# Patient Record
Sex: FEMALE | Race: WHITE | NOT HISPANIC OR LATINO | Employment: FULL TIME | URBAN - METROPOLITAN AREA
[De-identification: names, ages, dates, MRNs, and addresses within clinical notes are randomized per-mention and may not be internally consistent; named-entity substitution may affect disease eponyms.]

---

## 2017-12-06 ENCOUNTER — GENERIC CONVERSION - ENCOUNTER (OUTPATIENT)
Dept: OTHER | Facility: OTHER | Age: 22
End: 2017-12-06

## 2017-12-06 DIAGNOSIS — R53.83 OTHER FATIGUE: ICD-10-CM

## 2017-12-06 DIAGNOSIS — J45.909 UNCOMPLICATED ASTHMA: ICD-10-CM

## 2017-12-14 ENCOUNTER — LAB (OUTPATIENT)
Dept: LAB | Age: 22
End: 2017-12-14
Payer: COMMERCIAL

## 2017-12-14 ENCOUNTER — TRANSCRIBE ORDERS (OUTPATIENT)
Dept: ADMINISTRATIVE | Age: 22
End: 2017-12-14

## 2017-12-14 DIAGNOSIS — J45.909 UNCOMPLICATED ASTHMA: ICD-10-CM

## 2017-12-14 DIAGNOSIS — R53.83 OTHER FATIGUE: ICD-10-CM

## 2017-12-14 LAB
25(OH)D3 SERPL-MCNC: 26.2 NG/ML (ref 30–100)
ALBUMIN SERPL BCP-MCNC: 3.7 G/DL (ref 3.5–5)
ALP SERPL-CCNC: 48 U/L (ref 46–116)
ALT SERPL W P-5'-P-CCNC: 16 U/L (ref 12–78)
ANION GAP SERPL CALCULATED.3IONS-SCNC: 7 MMOL/L (ref 4–13)
AST SERPL W P-5'-P-CCNC: 14 U/L (ref 5–45)
BASOPHILS # BLD AUTO: 0.03 THOUSANDS/ΜL (ref 0–0.1)
BASOPHILS NFR BLD AUTO: 1 % (ref 0–1)
BILIRUB SERPL-MCNC: 1.12 MG/DL (ref 0.2–1)
BUN SERPL-MCNC: 11 MG/DL (ref 5–25)
CALCIUM SERPL-MCNC: 9.5 MG/DL (ref 8.3–10.1)
CHLORIDE SERPL-SCNC: 104 MMOL/L (ref 100–108)
CO2 SERPL-SCNC: 27 MMOL/L (ref 21–32)
CREAT SERPL-MCNC: 0.94 MG/DL (ref 0.6–1.3)
EOSINOPHIL # BLD AUTO: 0.06 THOUSAND/ΜL (ref 0–0.61)
EOSINOPHIL NFR BLD AUTO: 1 % (ref 0–6)
ERYTHROCYTE [DISTWIDTH] IN BLOOD BY AUTOMATED COUNT: 12 % (ref 11.6–15.1)
GFR SERPL CREATININE-BSD FRML MDRD: 86 ML/MIN/1.73SQ M
GLUCOSE SERPL-MCNC: 93 MG/DL (ref 65–140)
HCT VFR BLD AUTO: 38.1 % (ref 34.8–46.1)
HGB BLD-MCNC: 12.7 G/DL (ref 11.5–15.4)
LYMPHOCYTES # BLD AUTO: 2.02 THOUSANDS/ΜL (ref 0.6–4.47)
LYMPHOCYTES NFR BLD AUTO: 39 % (ref 14–44)
MCH RBC QN AUTO: 31.4 PG (ref 26.8–34.3)
MCHC RBC AUTO-ENTMCNC: 33.3 G/DL (ref 31.4–37.4)
MCV RBC AUTO: 94 FL (ref 82–98)
MONOCYTES # BLD AUTO: 0.32 THOUSAND/ΜL (ref 0.17–1.22)
MONOCYTES NFR BLD AUTO: 6 % (ref 4–12)
NEUTROPHILS # BLD AUTO: 2.75 THOUSANDS/ΜL (ref 1.85–7.62)
NEUTS SEG NFR BLD AUTO: 53 % (ref 43–75)
NRBC BLD AUTO-RTO: 0 /100 WBCS
PLATELET # BLD AUTO: 293 THOUSANDS/UL (ref 149–390)
PMV BLD AUTO: 10.6 FL (ref 8.9–12.7)
POTASSIUM SERPL-SCNC: 4.1 MMOL/L (ref 3.5–5.3)
PROT SERPL-MCNC: 7.7 G/DL (ref 6.4–8.2)
RBC # BLD AUTO: 4.04 MILLION/UL (ref 3.81–5.12)
SODIUM SERPL-SCNC: 138 MMOL/L (ref 136–145)
TSH SERPL DL<=0.05 MIU/L-ACNC: 1.97 UIU/ML (ref 0.36–3.74)
VIT B12 SERPL-MCNC: 275 PG/ML (ref 100–900)
WBC # BLD AUTO: 5.19 THOUSAND/UL (ref 4.31–10.16)

## 2017-12-14 PROCEDURE — 36415 COLL VENOUS BLD VENIPUNCTURE: CPT

## 2017-12-14 PROCEDURE — 82607 VITAMIN B-12: CPT

## 2017-12-14 PROCEDURE — 82306 VITAMIN D 25 HYDROXY: CPT

## 2017-12-14 PROCEDURE — 85025 COMPLETE CBC W/AUTO DIFF WBC: CPT

## 2017-12-14 PROCEDURE — 80053 COMPREHEN METABOLIC PANEL: CPT

## 2017-12-14 PROCEDURE — 84443 ASSAY THYROID STIM HORMONE: CPT

## 2017-12-15 ENCOUNTER — GENERIC CONVERSION - ENCOUNTER (OUTPATIENT)
Dept: OTHER | Facility: OTHER | Age: 22
End: 2017-12-15

## 2018-01-23 NOTE — RESULT NOTES
Verified Results  (1) CBC/PLT/DIFF [id number] 01:14PM Carla Nash Order Number: [id number]     Test Name Result Flag Reference   WBC COUNT 5 19 Thousand/uL  4 31-10 16   RBC COUNT 4 04 Million/uL  3 81-5 12   HEMOGLOBIN 12 7 g/dL  11 5-15 4   HEMATOCRIT 38 1 %  34 8-46  1   MCV 94 fL  82-98   MCH 31 4 pg  26 8-34 3   MCHC 33 3 g/dL  31 4-37 4   RDW 12 0 %  11 6-15 1   MPV 10 6 fL  8 9-12 7   PLATELET COUNT 956 Thousands/uL  149-390   nRBC AUTOMATED 0 /100 WBCs     NEUTROPHILS RELATIVE PERCENT 53 %  43-75   LYMPHOCYTES RELATIVE PERCENT 39 %  14-44   MONOCYTES RELATIVE PERCENT 6 %  4-12   EOSINOPHILS RELATIVE PERCENT 1 %  0-6   BASOPHILS RELATIVE PERCENT 1 %  0-1   NEUTROPHILS ABSOLUTE COUNT 2 75 Thousands/? ??L  1 85-7 62   LYMPHOCYTES ABSOLUTE COUNT 2 02 Thousands/? ??L  0 60-4 47   MONOCYTES ABSOLUTE COUNT 0 32 Thousand/? ??L  0 17-1 22   EOSINOPHILS ABSOLUTE COUNT 0 06 Thousand/? ??L  0 00-0 61   BASOPHILS ABSOLUTE COUNT 0 03 Thousands/? ??L  0 00-0 10     (1) COMPREHENSIVE METABOLIC PANEL [id number] 10:03FU Carla Nash Order Number: [id number]     Test Name Result Flag Reference   GLUCOSE,RANDM 93 mg/dL     If the patient is fasting, the ADA then defines impaired fasting glucose as > 100 mg/dL and diabetes as > or equal to 123 mg/dL  Specimen collection should occur prior to Sulfasalazine administration due to the potential for falsely depressed results  Specimen collection should occur prior to Sulfapyridine administration due to the potential for falsely elevated results     SODIUM 138 mmol/L  136-145   POTASSIUM 4 1 mmol/L  3 5-5 3   CHLORIDE 104 mmol/L  100-108   CARBON DIOXIDE 27 mmol/L  21-32   ANION GAP (CALC) 7 mmol/L  4-13   BLOOD UREA NITROGEN 11 mg/dL  5-25   CREATININE 0 94 mg/dL  0 60-1 30   Standardized to IDMS reference method   CALCIUM 9 5 mg/dL  8 3-10 1   BILI, TOTAL 1 12 mg/dL H 0 20-1 00   ALK PHOSPHATAS 48 U/L     ALT (SGPT) 16 U/L  12-78 Specimen collection should occur prior to Sulfasalazine and/or Sulfapyridine administration due to the potential for falsely depressed results  AST(SGOT) 14 U/L  5-45   Specimen collection should occur prior to Sulfasalazine administration due to the potential for falsely depressed results  ALBUMIN 3 7 g/dL  3 5-5 0   TOTAL PROTEIN 7 7 g/dL  6 4-8 2   eGFR 86 ml/min/1 73sq m     National Kidney Disease Education Program recommendations are as follows:  GFR calculation is accurate only with a steady state creatinine  Chronic Kidney disease less than 60 ml/min/1 73 sq  meters  Kidney failure less than 15 ml/min/1 73 sq  meters  (1) TSH WITH FT4 REFLEX [id number] 01:14PM Cindy Maher Order Number: [id number]     Test Name Result Flag Reference   TSH 1 970 uIU/mL  0 358-3 740   Patients undergoing fluorescein dye angiography may retain small amounts of fluorescein in the body for 48-72 hours post procedure  Samples containing fluorescein can produce falsely depressed TSH values  If the patient had this procedure,a specimen should be resubmitted post fluorescein clearance  The recommended reference ranges for TSH during pregnancy are as follows:  First trimester 0 1 to 2 5 uIU/mL  Second trimester  0 2 to 3 0 uIU/mL  Third trimester 0 3 to 3 0 uIU/m     (1) VITAMIN D 25-HYDROXY [id number] 01:14PM Cindy Maher Order Number: [id number]     Test Name Result Flag Reference   VIT D 25-HYDROX 26 2 ng/mL L 30 0-100 0   This assay is a certified procedure of the CDC Vitamin D Standardization Certification Program (VDSCP)     Deficiency <20ng/ml   Insufficiency 20-30ng/ml   Sufficient  ng/ml     *Patients undergoing fluorescein dye angiography may retain small amounts of fluorescein in the body for 48-72 hours post procedure  Samples containing fluorescein can produce falsely elevated Vitamin D values   If the patient had this procedure, a specimen should be resubmitted post fluorescein clearance       (1) VITAMIN B12 [id number] 01:14PM Roberto August Order Number: [id number]     Test Name Result Flag Reference   VITAMIN B12 275 pg/mL  100-900       Plan  Vitamin D deficiency    · Vitamin D3 2000 UNIT Oral Tablet; take 2 tab daily

## 2018-01-24 VITALS
HEIGHT: 67 IN | WEIGHT: 134 LBS | BODY MASS INDEX: 21.03 KG/M2 | DIASTOLIC BLOOD PRESSURE: 60 MMHG | RESPIRATION RATE: 16 BRPM | HEART RATE: 76 BPM | SYSTOLIC BLOOD PRESSURE: 100 MMHG | TEMPERATURE: 98 F

## 2022-05-18 ENCOUNTER — OFFICE VISIT (OUTPATIENT)
Dept: OBGYN CLINIC | Facility: CLINIC | Age: 27
End: 2022-05-18
Payer: COMMERCIAL

## 2022-05-18 VITALS
HEIGHT: 67 IN | SYSTOLIC BLOOD PRESSURE: 112 MMHG | BODY MASS INDEX: 21.19 KG/M2 | DIASTOLIC BLOOD PRESSURE: 64 MMHG | WEIGHT: 135 LBS

## 2022-05-18 DIAGNOSIS — N94.6 DYSMENORRHEA: ICD-10-CM

## 2022-05-18 DIAGNOSIS — R10.2 PELVIC PAIN: ICD-10-CM

## 2022-05-18 DIAGNOSIS — Z01.419 WELL WOMAN EXAM WITH ROUTINE GYNECOLOGICAL EXAM: Primary | ICD-10-CM

## 2022-05-18 DIAGNOSIS — Z12.4 ENCOUNTER FOR SCREENING FOR MALIGNANT NEOPLASM OF CERVIX: ICD-10-CM

## 2022-05-18 PROCEDURE — 99385 PREV VISIT NEW AGE 18-39: CPT | Performed by: OBSTETRICS & GYNECOLOGY

## 2022-05-18 PROCEDURE — G0145 SCR C/V CYTO,THINLAYER,RESCR: HCPCS | Performed by: OBSTETRICS & GYNECOLOGY

## 2022-05-18 RX ORDER — CHOLECALCIFEROL (VITAMIN D3) 1250 MCG
2 CAPSULE ORAL DAILY
COMMUNITY
Start: 2017-12-15

## 2022-05-18 NOTE — PROGRESS NOTES
ASSESSMENT & PLAN:   Diagnoses and all orders for this visit:    Well woman exam with routine gynecological exam  -     Liquid-based pap, screening    Encounter for screening for malignant neoplasm of cervix  -     Liquid-based pap, screening    Dysmenorrhea  -     US pelvis complete w transvaginal; Future    Pelvic pain  -     US pelvis complete w transvaginal; Future    Other orders  -     Cholecalciferol (Vitamin D3) 1 25 MG (95765 UT) CAPS; Take 2 tablets by mouth in the morning  Discussed dysmenorrhea, previously responsive to combined OCPs  Patient does not desire to be on hormonal birth control at this time  Pelvic US ordered due to recent RUQ pain cyclic with menses  The following were reviewed in today's visit: ASCCP guidelines, Gardisil vaccination, STD testing breast self exam, family planning choices, exercise and healthy diet  Patient to return to office in yearly for annual exam      All questions have been answered to her satisfaction  CC:  Annual Gynecologic Examination  Chief Complaint   [facility]   [address] [facility] [address] [facility]     NP here today for her annual exam  States her last annual exam was 1/2021 with her last pap being done 1/2020 in [facility]  HPI: Alina Busch is a 28 y o  No obstetric history on file  who presents for annual gynecologic examination  She has the following concerns:    - new patient  Establish gyn care  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 [employer]  She and her  relocated back to the [facility] from [place]  She is safe in her relationship and denies pain with intercourse  She does report dysmenorrhea that can be severe for a limited time each month (usually just 1 day and only for an hour or two) during which she will have nausea/diarrhea and severe pelvic pain  She was started on combined OCPs in college with improvement but has since stopped  She does not desire to go back on hormonal birth control at this time   She also reports loose stools occurring twice daily, possibly IBS concerns  Health Maintenance:    Exercise: frequently  Breast exams/breast awareness: yes  Diet: well balanced diet      History reviewed  No pertinent past medical history  Past Surgical History:   Procedure Laterality Date    TONSILLECTOMY         Past OB/Gyn History:  Period Cycle (Days): 35  Period Duration (Days): 4-5  Period Pattern: (!) Irregular  Menstrual Flow: Moderate  Menstrual Control: TamponPatient's last menstrual period was 05/12/2022 (exact date)  Last Pap: 2020 : no abnormalities, per patient  2018, no abnormalities per chart review  History of abnormal Pap smear: no  HPV vaccine completed: yes    Patient is currently sexually active     STD testing: no  Current contraception: none      Family History  Family History   Problem Relation Age of Onset    No Known Problems Mother     Hypertension Father     Colon cancer Paternal Grandfather        Family history of uterine or ovarian cancer: no  Family history of breast cancer: no  Family history of colon cancer: yes, paternal grandfather    Social History:  Social History     Socioeconomic History    Marital status: /Civil Union     Spouse name: Not on file    Number of children: Not on file    Years of education: Not on file    Highest education level: Not on file   Occupational History    Not on file   Tobacco Use    Smoking status: Never Smoker    Smokeless tobacco: Never Used   Vaping Use    Vaping Use: Never used   Substance and Sexual Activity    Alcohol use: Yes     Comment: socially    Drug use: Never    Sexual activity: Yes     Partners: Male     Birth control/protection: Condom   Other Topics Concern    Not on file   Social History Narrative    Not on file     Social Determinants of Health     Financial Resource Strain: Not on file   Food Insecurity: Not on file   Transportation Needs: Not on file   Physical Activity: Not on file   Stress: Not on file Social Connections: Not on file   Intimate Partner Violence: Not on file   Housing Stability: Not on file     Domestic violence screen: negative    Allergies:  No Known Allergies    Medications:    Current Outpatient Medications:     Cholecalciferol (Vitamin D3) 1 25 MG (78110 UT) CAPS, Take 2 tablets by mouth in the morning , Disp: , Rfl:     Review of Systems:  Review of Systems   Constitutional: Negative for activity change, appetite change and unexpected weight change  Respiratory: Negative for cough and shortness of breath  Cardiovascular: Negative for chest pain  Gastrointestinal: Negative for abdominal pain, constipation, diarrhea, nausea and vomiting  Genitourinary: Positive for menstrual problem (dysmenorrhea)  Negative for difficulty urinating, dyspareunia, frequency, pelvic pain, urgency, vaginal bleeding, vaginal discharge and vaginal pain  Musculoskeletal: Negative for back pain  Skin: Negative  Neurological: Negative for dizziness, weakness, light-headedness and headaches  Psychiatric/Behavioral: Negative  Physical Exam:  /64 (BP Location: Right arm, Patient Position: Sitting, Cuff Size: Standard)   Ht 5' 7" (1 702 m)   Wt 61 2 kg (135 lb)   LMP 05/12/2022 (Exact Date)   Breastfeeding No   BMI 21 14 kg/m²    Physical Exam  Constitutional:       General: She is not in acute distress  Appearance: Normal appearance  She is well-developed and normal weight  She is not diaphoretic  Genitourinary:      Vulva and bladder normal       No lesions in the vagina  Genitourinary Comments: Perineum normal in appearance, no lacerations, no ulcerations, no lesions visualized  Right Labia: No rash, tenderness or lesions  Left Labia: No tenderness, lesions or rash  No inguinal adenopathy present in the right or left side  No vaginal discharge, erythema, tenderness or bleeding  No vaginal prolapse present  No vaginal atrophy present  Right Adnexa: not tender, not full and no mass present  Left Adnexa: not tender, not full and no mass present  Cervix is nulliparous  No cervical motion tenderness, discharge, friability, lesion or polyp  No parametrium nodularity or thickening present  Uterus is not enlarged or tender  No uterine mass detected  Uterus is retroverted  No urethral prolapse or mass present  Bladder is not tender  Pelvic exam was performed with patient in the lithotomy position  Rectum:      No tenderness or external hemorrhoid  Breasts: Breasts are symmetrical       Right: No swelling, bleeding, mass, skin change or tenderness  Left: No swelling, bleeding, mass, skin change or tenderness  HENT:      Head: Normocephalic and atraumatic  Neck:      Thyroid: No thyromegaly or thyroid tenderness  Cardiovascular:      Rate and Rhythm: Normal rate and regular rhythm  Heart sounds: Normal heart sounds  No murmur heard  No friction rub  Pulmonary:      Effort: Pulmonary effort is normal  No respiratory distress  Breath sounds: Normal breath sounds  No wheezing or rales  Abdominal:      Palpations: Abdomen is soft  There is no mass  Tenderness: There is no abdominal tenderness  There is no guarding  Musculoskeletal:         General: No tenderness  Normal range of motion  Right lower leg: No edema  Left lower leg: No edema  Lymphadenopathy:      Lower Body: No right inguinal adenopathy  No left inguinal adenopathy  Neurological:      Mental Status: She is alert and oriented to person, place, and time  Skin:     General: Skin is warm and dry  Coloration: Skin is not pale  Findings: No erythema  Psychiatric:         Mood and Affect: Mood normal          Behavior: Behavior normal          Thought Content: Thought content normal          Judgment: Judgment normal    Vitals and nursing note reviewed

## 2022-05-25 ENCOUNTER — HOSPITAL ENCOUNTER (OUTPATIENT)
Dept: RADIOLOGY | Age: 27
Discharge: HOME | End: 2022-05-25
Payer: COMMERCIAL

## 2022-05-25 DIAGNOSIS — N94.6 DYSMENORRHEA: ICD-10-CM

## 2022-05-25 DIAGNOSIS — R10.2 PELVIC PAIN: ICD-10-CM

## 2022-05-25 LAB
LAB AP GYN PRIMARY INTERPRETATION: NORMAL
Lab: NORMAL
PATH INTERP SPEC-IMP: NORMAL

## 2022-05-25 PROCEDURE — 76830 TRANSVAGINAL US NON-OB: CPT

## 2022-05-25 PROCEDURE — 76856 US EXAM PELVIC COMPLETE: CPT
